# Patient Record
Sex: MALE | Race: OTHER | Employment: OTHER | URBAN - METROPOLITAN AREA
[De-identification: names, ages, dates, MRNs, and addresses within clinical notes are randomized per-mention and may not be internally consistent; named-entity substitution may affect disease eponyms.]

---

## 2020-07-28 NOTE — PATIENT DISCUSSION
VMT OS - REFER TO DR. Jaime Diego FOR CONSULT AND POSSIBLE TREATMENT. WILL NEED CLEARANCE FOR WHEN THE TIME COMES SHE IS READY TO HAVE THE CATARACTS REMOVED.

## 2020-07-28 NOTE — PATIENT DISCUSSION
Vitreomacular Traction (VMT) Counseling:  Vitreomacular traction is a condition in which the vitreous gel has an abnormally strong adhesion to the retina. The signs/symptoms of Vitreomacular Traction were reviewed including, but not limited to, blurred or reduced vision, metamorphopsia, micropsia, scotoma, and difficulties with daily vision-related tasks such as reading. Consultation with a specialist is recommended.

## 2020-07-28 NOTE — PATIENT DISCUSSION
ANTONIO/K SICCA, OU- PRESCRIBED ARTIFICIAL TEARS BID - QID, OU AND THE DAILY INTAKE OF OMEGA 3/FATTY ACIDS. CONSIDER OTHER TREATMENT OPTIONS IF SYMPTOMS PERSIST/WORSEN. FOLLOW. RECOMMEND SYSTANE COMPLETE QID OU.

## 2020-07-28 NOTE — PATIENT DISCUSSION
CATARACTS, OU-  NOT VISUALLY SIGNIFICANT. PT FEELS SHE IS FUNTIONING WELL. PT TO CONSIDER SURGERY. FOLLOW.

## 2020-09-09 ENCOUNTER — PREPPED CHART (OUTPATIENT)
Dept: URBAN - METROPOLITAN AREA CLINIC 25 | Facility: CLINIC | Age: 60
End: 2020-09-09

## 2020-09-09 NOTE — PATIENT DISCUSSION
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 PATIENT ELECTS TO CONTINUE TO OBSERVE. RETURN AS SCHEDULED.

## 2021-09-20 ASSESSMENT — TONOMETRY
OD_IOP_MMHG: 18
OS_IOP_MMHG: 18

## 2021-09-22 ENCOUNTER — ESTABLISHED COMPREHENSIVE EXAM (OUTPATIENT)
Dept: URBAN - METROPOLITAN AREA CLINIC 25 | Facility: CLINIC | Age: 61
End: 2021-09-22

## 2021-09-22 DIAGNOSIS — H52.223: ICD-10-CM

## 2021-09-22 DIAGNOSIS — H52.13: ICD-10-CM

## 2021-09-22 DIAGNOSIS — H52.4: ICD-10-CM

## 2021-09-22 PROCEDURE — 92015 DETERMINE REFRACTIVE STATE: CPT

## 2021-09-22 PROCEDURE — 92014 COMPRE OPH EXAM EST PT 1/>: CPT

## 2021-09-22 ASSESSMENT — VISUAL ACUITY
OD_CC: 20/25-2
OS_CC: 20/20

## 2021-09-22 ASSESSMENT — KERATOMETRY
OS_AXISANGLE_DEGREES: 97
OD_K1POWER_DIOPTERS: 43.25
OD_AXISANGLE_DEGREES: 19
OD_AXISANGLE2_DEGREES: 109
OS_K2POWER_DIOPTERS: 43.50
OS_AXISANGLE2_DEGREES: 7
OD_K2POWER_DIOPTERS: 43.75
OS_K1POWER_DIOPTERS: 43.00

## 2021-09-22 ASSESSMENT — TONOMETRY
OS_IOP_MMHG: 18
OD_IOP_MMHG: 13

## 2022-09-19 ENCOUNTER — ESTABLISHED PATIENT (OUTPATIENT)
Dept: URBAN - METROPOLITAN AREA CLINIC 25 | Facility: CLINIC | Age: 62
End: 2022-09-19

## 2022-09-19 DIAGNOSIS — H52.13: ICD-10-CM

## 2022-09-19 DIAGNOSIS — H52.4: ICD-10-CM

## 2022-09-19 DIAGNOSIS — H52.223: ICD-10-CM

## 2022-09-19 PROCEDURE — 92014 COMPRE OPH EXAM EST PT 1/>: CPT

## 2022-09-19 PROCEDURE — 92015 DETERMINE REFRACTIVE STATE: CPT

## 2022-09-19 ASSESSMENT — KERATOMETRY
OS_K2POWER_DIOPTERS: 43.50
OD_K2POWER_DIOPTERS: 43.50
OS_AXISANGLE2_DEGREES: 11
OD_K2POWER_DIOPTERS: 43.75
OS_AXISANGLE_DEGREES: 101
OD_AXISANGLE2_DEGREES: 109
OS_K1POWER_DIOPTERS: 43.00
OD_AXISANGLE_DEGREES: 19
OD_K1POWER_DIOPTERS: 43.25
OS_K1POWER_DIOPTERS: 43.25
OD_AXISANGLE_DEGREES: 31
OS_AXISANGLE_DEGREES: 97
OS_AXISANGLE2_DEGREES: 7
OD_AXISANGLE2_DEGREES: 121

## 2022-09-19 ASSESSMENT — TONOMETRY
OS_IOP_MMHG: 18
OD_IOP_MMHG: 16

## 2022-09-19 ASSESSMENT — VISUAL ACUITY
OS_CC: 20/25
OD_CC: 20/30
OD_SC: 20/30

## 2022-12-08 NOTE — PATIENT DISCUSSION
Doing well. Good post-operative appearance. The patient notices improvement in vision. Continue post op drops as per instruction sheet. Do not rub or get water in eye. Reviewed endophthalmitis precautions. All questions answered.

## 2022-12-08 NOTE — PATIENT DISCUSSION
PO INSTRUCTIONS: The patient was instructed in the proper use of post-operative eye drops pred-moxi-brom 3x/day in surgical eye for 2 weeks, then 2x/day for 1 week, then 1x/day for 1 week. Written instructions were given and reviewed with the patient. Call back instructions, retinal detachment and endophthalmitis precautions given.

## 2022-12-15 NOTE — PATIENT DISCUSSION
PO INSTRUCTIONS: The patient was instructed in the proper use of post-operative eye drops: pred-moxi-brom in the surgical eye 3 times per day for 2 weeks, then 2 times per day for 1 week, then 1 time per day for 1 week, then discontinue. Written instructions were given and reviewed with the patient. Call back instructions, retinal detachment and endophthalmitis precautions given.

## 2023-08-16 ENCOUNTER — WEB ENCOUNTER (OUTPATIENT)
Dept: URBAN - METROPOLITAN AREA CLINIC 7 | Facility: CLINIC | Age: 63
End: 2023-08-16

## 2023-08-22 ENCOUNTER — OFFICE VISIT (OUTPATIENT)
Dept: URBAN - METROPOLITAN AREA CLINIC 7 | Facility: CLINIC | Age: 63
End: 2023-08-22
Payer: COMMERCIAL

## 2023-08-22 ENCOUNTER — DASHBOARD ENCOUNTERS (OUTPATIENT)
Age: 63
End: 2023-08-22

## 2023-08-22 ENCOUNTER — WEB ENCOUNTER (OUTPATIENT)
Dept: URBAN - METROPOLITAN AREA CLINIC 7 | Facility: CLINIC | Age: 63
End: 2023-08-22

## 2023-08-22 ENCOUNTER — LAB OUTSIDE AN ENCOUNTER (OUTPATIENT)
Dept: URBAN - METROPOLITAN AREA CLINIC 7 | Facility: CLINIC | Age: 63
End: 2023-08-22

## 2023-08-22 VITALS
HEIGHT: 71 IN | TEMPERATURE: 97.9 F | SYSTOLIC BLOOD PRESSURE: 132 MMHG | DIASTOLIC BLOOD PRESSURE: 80 MMHG | BODY MASS INDEX: 40.04 KG/M2 | RESPIRATION RATE: 16 BRPM | WEIGHT: 286 LBS

## 2023-08-22 DIAGNOSIS — Z12.11 COLON CANCER SCREENING: ICD-10-CM

## 2023-08-22 DIAGNOSIS — R19.5 LOOSE STOOLS: ICD-10-CM

## 2023-08-22 DIAGNOSIS — K64.8 INTERNAL HEMORRHOID: ICD-10-CM

## 2023-08-22 PROBLEM — 398032003: Status: ACTIVE | Noted: 2023-08-22

## 2023-08-22 PROBLEM — 90458007: Status: ACTIVE | Noted: 2023-08-22

## 2023-08-22 PROCEDURE — 99204 OFFICE O/P NEW MOD 45 MIN: CPT | Performed by: INTERNAL MEDICINE

## 2023-08-22 RX ORDER — LOSARTAN POTASSIUM 100 MG/1
1 TABLET TABLET ORAL ONCE A DAY
Status: ACTIVE | COMMUNITY

## 2023-08-22 RX ORDER — CLOTRIMAZOLE AND BETAMETHASONE DIPROPIONATE 10; .5 MG/G; MG/G
1 APPLICATION CREAM TOPICAL TWICE A DAY
Status: ACTIVE | COMMUNITY

## 2023-08-22 RX ORDER — ROSUVASTATIN CALCIUM 10 MG/1
1 TABLET TABLET, COATED ORAL ONCE A DAY
Status: ACTIVE | COMMUNITY

## 2023-08-22 NOTE — HPI-TODAY'S VISIT:
62yo M with hx of chronic recurrent loose stools.   States that normally formed stool 2-3 times per day. About once every few months he will have unexplained fecal incontinece that happens uncontrollably has large watery dairrhea accidents.   There has been no associated  dysphagia,  odynophagia, anorexia, weight loss,  anemia, occult blood in stool, rectal bleeding, melena,  nausea, vomiting, abdominal pain, GERD, postprandial fullness, early satiety, abdominal distention, bloating, change in bowel habits, change in stool caliber, diarrhea, constipation,  no anticoagulant/antiplatelet or NSAID's use.      last colonoscopy 9-10yrs ago was normal   No FMH of GI cancers including CRC.

## 2023-09-20 ENCOUNTER — ESTABLISHED PATIENT (OUTPATIENT)
Dept: URBAN - METROPOLITAN AREA CLINIC 25 | Facility: CLINIC | Age: 63
End: 2023-09-20

## 2023-09-20 DIAGNOSIS — H52.223: ICD-10-CM

## 2023-09-20 DIAGNOSIS — H52.13: ICD-10-CM

## 2023-09-20 DIAGNOSIS — H52.4: ICD-10-CM

## 2023-09-20 PROCEDURE — 92014 COMPRE OPH EXAM EST PT 1/>: CPT

## 2023-09-20 PROCEDURE — 92015 DETERMINE REFRACTIVE STATE: CPT

## 2023-09-20 ASSESSMENT — KERATOMETRY
OD_AXISANGLE2_DEGREES: 121
OD_AXISANGLE_DEGREES: 19
OD_K1POWER_DIOPTERS: 43.00
OD_AXISANGLE2_DEGREES: 113
OS_AXISANGLE2_DEGREES: 7
OD_AXISANGLE2_DEGREES: 109
OS_K2POWER_DIOPTERS: 43.50
OS_AXISANGLE_DEGREES: 101
OD_K2POWER_DIOPTERS: 43.75
OD_AXISANGLE_DEGREES: 31
OS_AXISANGLE_DEGREES: 97
OD_K2POWER_DIOPTERS: 43.50
OS_AXISANGLE2_DEGREES: 11
OD_AXISANGLE_DEGREES: 23
OS_K1POWER_DIOPTERS: 43.00
OD_K1POWER_DIOPTERS: 43.25
OS_K1POWER_DIOPTERS: 43.25

## 2023-09-20 ASSESSMENT — TONOMETRY
OS_IOP_MMHG: 18
OD_IOP_MMHG: 18

## 2023-09-20 ASSESSMENT — VISUAL ACUITY
OD_CC: 20/20
OS_CC: 20/20

## 2024-09-19 ENCOUNTER — TELEPHONE ENCOUNTER (OUTPATIENT)
Dept: URBAN - METROPOLITAN AREA CLINIC 7 | Facility: CLINIC | Age: 64
End: 2024-09-19